# Patient Record
Sex: FEMALE | URBAN - METROPOLITAN AREA
[De-identification: names, ages, dates, MRNs, and addresses within clinical notes are randomized per-mention and may not be internally consistent; named-entity substitution may affect disease eponyms.]

---

## 2024-02-13 ENCOUNTER — OV NP (OUTPATIENT)
Dept: URBAN - METROPOLITAN AREA CLINIC 94 | Facility: CLINIC | Age: 34
End: 2024-02-13
Payer: COMMERCIAL

## 2024-02-13 VITALS
TEMPERATURE: 98.1 F | SYSTOLIC BLOOD PRESSURE: 116 MMHG | BODY MASS INDEX: 35.93 KG/M2 | DIASTOLIC BLOOD PRESSURE: 75 MMHG | WEIGHT: 183 LBS | HEIGHT: 60 IN | HEART RATE: 92 BPM

## 2024-02-13 DIAGNOSIS — K62.5 RECTAL BLEEDING: ICD-10-CM

## 2024-02-13 DIAGNOSIS — R19.7 DIARRHEA, UNSPECIFIED TYPE: ICD-10-CM

## 2024-02-13 PROBLEM — 12063002: Status: ACTIVE | Noted: 2024-02-13

## 2024-02-13 PROCEDURE — 99203 OFFICE O/P NEW LOW 30 MIN: CPT | Performed by: NURSE PRACTITIONER

## 2024-02-13 RX ORDER — POLYETHYLENE GLYCOL-3350 AND ELECTROLYTES WITH FLAVOR PACK 240; 5.84; 2.98; 6.72; 22.72 G/278.26G; G/278.26G; G/278.26G; G/278.26G; G/278.26G
4000 MILLILITERS POWDER, FOR SOLUTION ORAL
Qty: 4000 MILLILITER | Refills: 0 | OUTPATIENT
Start: 2024-02-13 | End: 2024-02-14

## 2024-02-13 RX ORDER — TOPIRAMATE 25 MG/1
1 TABLET TABLET, COATED ORAL ONCE A DAY
Status: ACTIVE | COMMUNITY

## 2024-02-13 RX ORDER — ESCITALOPRAM OXALATE 10 MG/1
1 TABLET TABLET, FILM COATED ORAL ONCE A DAY
Status: ACTIVE | COMMUNITY

## 2024-02-13 NOTE — HPI-TODAY'S VISIT:
34 y/o F presents for chronic diarrhea.   Diarrhea has been present for about 1 year. Admits to regular BMs, at least 1-2x a day which some are formed, regular stools and some are diarrhea. Diarrhea occurs on avg 2-4x per week, can occur immediately following meals or after periods of fasting. Admits lactose and gluten do exacerbate sx and is currently limiting these. Will have very occasional stomach pain but this is a rarity.  Had 2 stomach viruses in January which worsened sx but does report improvement in sx with  eating for about 2 weeks. Diarrhea is not watery, does endorse 1-2 instances of mucous stools with stomach virus. Attempted immodium with some relief, only takes occasionally.   Admits to intermittent rectal bleeding that is new onset, occuring in the last few months. Last occurrence 2-3 weeks ago. Does not occur with every BM.   Denies heartburn, n/v, constipation, unintentional wt loss, anemia. Denies watery stool, denies foul smelling stool.  No heart, lung disease. No blood thinners.   No prev CLS. Patient was seen in collaboration with Ramona Hemphill PA-C.

## 2024-02-14 LAB
A/G RATIO: 1.5
ABSOLUTE BASOPHILS: 59
ABSOLUTE EOSINOPHILS: 83
ABSOLUTE LYMPHOCYTES: 1782
ABSOLUTE MONOCYTES: 431
ABSOLUTE NEUTROPHILS: 3546
ALBUMIN: 4.6
ALKALINE PHOSPHATASE: 75
ALT (SGPT): 45
AST (SGOT): 98
BASOPHILS: 1
BILIRUBIN, TOTAL: 0.7
BUN/CREATININE RATIO: (no result)
BUN: 11
CALCIUM: 9.9
CARBON DIOXIDE, TOTAL: 25
CHLORIDE: 104
CREATININE: 0.64
EGFR: 120
EOSINOPHILS: 1.4
GLOBULIN, TOTAL: 3.1
GLUCOSE: 94
HEMATOCRIT: 40.9
HEMOGLOBIN: 14
LYMPHOCYTES: 30.2
MCH: 29.5
MCHC: 34.2
MCV: 86.3
MONOCYTES: 7.3
MPV: 9.6
NEUTROPHILS: 60.1
PLATELET COUNT: 442
POTASSIUM: 4.8
PROTEIN, TOTAL: 7.7
RDW: 13
RED BLOOD CELL COUNT: 4.74
SODIUM: 139
WHITE BLOOD CELL COUNT: 5.9

## 2024-02-17 LAB
CAMPYLOBACTER GROUP: NOT DETECTED
NOROVIRUS GI/GII: DETECTED
ROTAVIRUS A: NOT DETECTED
SALMONELLA SPECIES: NOT DETECTED
SHIGA TOXIN 1: NOT DETECTED
SHIGA TOXIN 2: NOT DETECTED
SHIGELLA SPECIES: NOT DETECTED
VIBRIO GROUP: NOT DETECTED
YERSINIA ENTEROCOLITICA: NOT DETECTED

## 2024-03-04 ENCOUNTER — COLON (OUTPATIENT)
Dept: URBAN - METROPOLITAN AREA SURGERY CENTER 17 | Facility: SURGERY CENTER | Age: 34
End: 2024-03-04
Payer: COMMERCIAL

## 2024-03-04 DIAGNOSIS — R19.7 ACUTE DIARRHEA: ICD-10-CM

## 2024-03-04 DIAGNOSIS — K62.5 ANAL BLEEDING: ICD-10-CM

## 2024-03-04 PROCEDURE — 45380 COLONOSCOPY AND BIOPSY: CPT | Performed by: INTERNAL MEDICINE

## 2024-03-22 ENCOUNTER — OV EP (OUTPATIENT)
Dept: URBAN - METROPOLITAN AREA CLINIC 94 | Facility: CLINIC | Age: 34
End: 2024-03-22

## 2024-03-22 VITALS
SYSTOLIC BLOOD PRESSURE: 119 MMHG | BODY MASS INDEX: 36.12 KG/M2 | DIASTOLIC BLOOD PRESSURE: 77 MMHG | HEIGHT: 60 IN | TEMPERATURE: 97.3 F | HEART RATE: 76 BPM | WEIGHT: 184 LBS

## 2024-03-22 RX ORDER — ESCITALOPRAM OXALATE 10 MG/1
1 TABLET TABLET, FILM COATED ORAL ONCE A DAY
Status: ACTIVE | COMMUNITY

## 2024-03-22 RX ORDER — TOPIRAMATE 25 MG/1
1 TABLET TABLET, COATED ORAL ONCE A DAY
Status: ACTIVE | COMMUNITY

## 2024-03-22 NOTE — HPI-TODAY'S VISIT:
32 y/o F presents for CLS f/u d/t rectal bleeding, chronic diarrhea.   Admits diarrhea, rectal bleeding have improved since CLS. Attributes diarrhea to dietary triggers, especially lactose and ETOH. States she had complete resolution of sx with current dietary changes and will experience return if she increased dairy, ETOH intake. Denies any abdominal pain, n/v, return of rectal bleeding.   Denies heartburn, n/v, constipation, unintentional wt loss, anemia. Denies watery stool, denies foul smelling stool.  No heart, lung disease. No blood thinners.   03/2024 CLS: Normal mucosa. IH and EH.  Patient was seen in collaboration with Ramona Hemphill PA-C.